# Patient Record
Sex: MALE | Race: ASIAN | NOT HISPANIC OR LATINO
[De-identification: names, ages, dates, MRNs, and addresses within clinical notes are randomized per-mention and may not be internally consistent; named-entity substitution may affect disease eponyms.]

---

## 2019-06-03 ENCOUNTER — APPOINTMENT (OUTPATIENT)
Dept: UROLOGY | Facility: CLINIC | Age: 69
End: 2019-06-03
Payer: MEDICAID

## 2019-06-03 VITALS
BODY MASS INDEX: 26.84 KG/M2 | DIASTOLIC BLOOD PRESSURE: 93 MMHG | RESPIRATION RATE: 18 BRPM | SYSTOLIC BLOOD PRESSURE: 158 MMHG | HEART RATE: 70 BPM | OXYGEN SATURATION: 98 % | HEIGHT: 66 IN | TEMPERATURE: 97.9 F | WEIGHT: 167 LBS

## 2019-06-03 DIAGNOSIS — I10 ESSENTIAL (PRIMARY) HYPERTENSION: ICD-10-CM

## 2019-06-03 DIAGNOSIS — E11.9 TYPE 2 DIABETES MELLITUS W/OUT COMPLICATIONS: ICD-10-CM

## 2019-06-03 PROCEDURE — 99204 OFFICE O/P NEW MOD 45 MIN: CPT | Mod: 25

## 2019-06-03 PROCEDURE — 51798 US URINE CAPACITY MEASURE: CPT

## 2019-06-03 RX ORDER — SITAGLIPTIN 100 MG/1
100 TABLET, FILM COATED ORAL
Refills: 0 | Status: ACTIVE | COMMUNITY

## 2019-06-03 RX ORDER — ASPIRIN 81 MG
81 TABLET, DELAYED RELEASE (ENTERIC COATED) ORAL
Refills: 0 | Status: ACTIVE | COMMUNITY

## 2019-06-03 RX ORDER — METFORMIN ER 500 MG 500 MG/1
500 TABLET ORAL
Refills: 0 | Status: ACTIVE | COMMUNITY

## 2019-06-03 RX ORDER — CARVEDILOL 6.25 MG/1
6.25 TABLET, FILM COATED ORAL
Refills: 0 | Status: ACTIVE | COMMUNITY

## 2019-06-03 RX ORDER — CIPROFLOXACIN HYDROCHLORIDE 500 MG/1
500 TABLET, FILM COATED ORAL
Qty: 4 | Refills: 0 | Status: ACTIVE | COMMUNITY
Start: 2019-06-03 | End: 1900-01-01

## 2019-06-03 NOTE — HISTORY OF PRESENT ILLNESS
[Urinary Incontinence] : no urinary incontinence [FreeTextEntry1] : Patient is a 69 yo M who presents for elevated PSA 5.58.  He had routine labs with PCP in 5/2019 showing PSA of 5.58.  His last prior PSA was in 2017 - which was 3.3.\par \par He has mild LUTS - urgency, frequency q1.5-2 hrs, nocturia x1.  He is not bothered enough to take medication.   [Urinary Retention] : no urinary retention [Nocturia] : nocturia [Straining] : no straining [Weak Stream] : no weak stream [Intermittency] : intermittency [Hematuria - Gross] : no gross hematuria [Dysuria] : no dysuria

## 2019-06-03 NOTE — ASSESSMENT
[FreeTextEntry1] : Patient is a 69 yo M who presents for BPH and elevated PSA.\par \par I had a long discussion with the pt regarding the significance of the elevated PSA and its implications including possibility of benign and malignant etiologies for elevated PSA. I discussed that although PSA is not a perfect test for prostate cancer, it is the most well studied screening tool for prostate cancer. I discussed that his value is considered abnormal and discussed with pt that routinely prostate biopsy is recommended to determine if there is cancer. \par I explained that if prostate cancer is indeed discovered, treatment options vary depending on the pathology characteristics. \par I also discussed risks of prostate biopsy - including infection/sepsis, bleeding (hematuria, hematochezia, hematospermia), pain. I counseled on nature of biopsy and need for enema and pre-procedure abx for decreasing risk of infection\par Will repeat PSA today.\par F/u for prostate biopsy\par PVR today 100cc

## 2019-06-03 NOTE — PHYSICAL EXAM
[General Appearance - Well Developed] : well developed [General Appearance - Well Nourished] : well nourished [Normal Appearance] : normal appearance [Well Groomed] : well groomed [General Appearance - In No Acute Distress] : no acute distress [Respiration, Rhythm And Depth] : normal respiratory rhythm and effort [Edema] : no peripheral edema [Abdomen Soft] : soft [Abdomen Tenderness] : non-tender [Exaggerated Use Of Accessory Muscles For Inspiration] : no accessory muscle use [Costovertebral Angle Tenderness] : no ~M costovertebral angle tenderness [Urethral Meatus] : meatus normal [Penis Abnormality] : normal uncircumcised penis [Urinary Bladder Findings] : the bladder was normal on palpation [Scrotum] : the scrotum was normal [Testes Tenderness] : no tenderness of the testes [Prostate Tenderness] : the prostate was not tender [Testes Mass (___cm)] : there were no testicular masses [No Prostate Nodules] : no prostate nodules [Prostate Size ___ gm] : prostate size [unfilled] gm [Normal Station and Gait] : the gait and station were normal for the patient's age [] : no rash [No Focal Deficits] : no focal deficits [Oriented To Time, Place, And Person] : oriented to person, place, and time [Mood] : the mood was normal [Affect] : the affect was normal [Not Anxious] : not anxious

## 2019-06-10 ENCOUNTER — APPOINTMENT (OUTPATIENT)
Dept: UROLOGY | Facility: CLINIC | Age: 69
End: 2019-06-10
Payer: MEDICAID

## 2019-06-10 VITALS — SYSTOLIC BLOOD PRESSURE: 123 MMHG | DIASTOLIC BLOOD PRESSURE: 79 MMHG | HEART RATE: 77 BPM | RESPIRATION RATE: 100 BRPM

## 2019-06-10 VITALS
WEIGHT: 165 LBS | DIASTOLIC BLOOD PRESSURE: 86 MMHG | OXYGEN SATURATION: 99 % | SYSTOLIC BLOOD PRESSURE: 138 MMHG | BODY MASS INDEX: 26.63 KG/M2 | HEART RATE: 67 BPM | RESPIRATION RATE: 18 BRPM | TEMPERATURE: 98 F

## 2019-06-10 LAB — PSA SERPL-MCNC: 5.28 NG/ML

## 2019-06-10 PROCEDURE — 55700: CPT

## 2019-06-10 PROCEDURE — 76942 ECHO GUIDE FOR BIOPSY: CPT | Mod: 59

## 2019-06-10 PROCEDURE — 76872 US TRANSRECTAL: CPT

## 2019-06-10 RX ORDER — AMIKACIN SULFATE 250 MG/ML
500 INJECTION, SOLUTION INTRAMUSCULAR; INTRAVENOUS
Qty: 0 | Refills: 0 | Status: COMPLETED | OUTPATIENT
Start: 2019-06-10

## 2019-06-10 RX ORDER — AMIKACIN SULFATE 250 MG/ML
500 INJECTION, SOLUTION INTRAMUSCULAR; INTRAVENOUS
Refills: 0 | Status: COMPLETED | OUTPATIENT
Start: 2019-06-10

## 2019-06-10 RX ADMIN — AMIKACIN SULFATE 2 MG/2ML: 250 INJECTION, SOLUTION INTRAMUSCULAR; INTRAVENOUS at 00:00

## 2019-06-20 ENCOUNTER — APPOINTMENT (OUTPATIENT)
Dept: UROLOGY | Facility: CLINIC | Age: 69
End: 2019-06-20
Payer: MEDICAID

## 2019-06-20 VITALS
SYSTOLIC BLOOD PRESSURE: 130 MMHG | OXYGEN SATURATION: 97 % | HEART RATE: 84 BPM | TEMPERATURE: 97.8 F | RESPIRATION RATE: 18 BRPM | WEIGHT: 168 LBS | BODY MASS INDEX: 27.12 KG/M2 | DIASTOLIC BLOOD PRESSURE: 79 MMHG

## 2019-06-20 LAB — CORE LAB BIOPSY: NORMAL

## 2019-06-20 PROCEDURE — 99213 OFFICE O/P EST LOW 20 MIN: CPT

## 2019-06-23 NOTE — PHYSICAL EXAM
[General Appearance - Well Nourished] : well nourished [Normal Appearance] : normal appearance [General Appearance - Well Developed] : well developed [Oriented To Time, Place, And Person] : oriented to person, place, and time [General Appearance - In No Acute Distress] : no acute distress [Well Groomed] : well groomed [Mood] : the mood was normal [Not Anxious] : not anxious [Affect] : the affect was normal

## 2019-06-23 NOTE — ASSESSMENT
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, remainder was BPH.\par I discussed with pt that HGPIN can be considered as a potential sign of potential prostate cancer that being harbored and not able to fully assessed, although it is not prostate cancer.\par I discussed with pt that I would recommend prostate biopsy on repeat within 2-3 months to reassess the prostate.\par I discussed with pt that prostate MRI may potentially be able to visualize a lesion to perform a targeted MRI fusion prostate biopsy, but unclear if will be covered by insurance.\par He wishes to hold off at this time with MRI or repeat biopsy, understands risks of missed prostate cancer diagnosis.\par Will f/u in 2-3 mos for repeat PSA.

## 2019-06-27 ENCOUNTER — APPOINTMENT (OUTPATIENT)
Dept: UROLOGY | Facility: CLINIC | Age: 69
End: 2019-06-27

## 2019-08-19 ENCOUNTER — APPOINTMENT (OUTPATIENT)
Dept: UROLOGY | Facility: CLINIC | Age: 69
End: 2019-08-19
Payer: MEDICAID

## 2019-08-19 VITALS
RESPIRATION RATE: 18 BRPM | TEMPERATURE: 97.9 F | SYSTOLIC BLOOD PRESSURE: 132 MMHG | HEART RATE: 80 BPM | OXYGEN SATURATION: 99 % | DIASTOLIC BLOOD PRESSURE: 83 MMHG | WEIGHT: 169 LBS | BODY MASS INDEX: 27.28 KG/M2

## 2019-08-19 PROCEDURE — 99213 OFFICE O/P EST LOW 20 MIN: CPT

## 2019-08-19 NOTE — HISTORY OF PRESENT ILLNESS
[FreeTextEntry1] : Patient is a 67 yo M who presents for f/u of elevated PSA 5.58.  He had routine labs with PCP in 5/2019 showing PSA of 5.58.  His last prior PSA was in 2017 - which was 3.3.\par \par He has mild LUTS - urgency, frequency q1.5-2 hrs, nocturia x1.  He is not bothered enough to take medication.  \par \par He underwent prostate biopsy 6/2019 - path showed HGPIN in 1 core, otherwise negative.  He was counseled on the implications of HGPIN but refused further evaluation with prostate MRI or repeat prostate biopsy.  \par \par He is here for repeat PSA blood draw.  He has no new symptoms or change in LUTS.  He is doing well.

## 2019-08-19 NOTE — PHYSICAL EXAM
[General Appearance - Well Developed] : well developed [General Appearance - Well Nourished] : well nourished [Normal Appearance] : normal appearance [General Appearance - In No Acute Distress] : no acute distress [Well Groomed] : well groomed [] : no respiratory distress [Exaggerated Use Of Accessory Muscles For Inspiration] : no accessory muscle use [Respiration, Rhythm And Depth] : normal respiratory rhythm and effort [Oriented To Time, Place, And Person] : oriented to person, place, and time [Affect] : the affect was normal [Mood] : the mood was normal [Not Anxious] : not anxious

## 2019-08-22 LAB — PSA SERPL-MCNC: 8.01 NG/ML

## 2019-12-16 ENCOUNTER — APPOINTMENT (OUTPATIENT)
Dept: UROLOGY | Facility: CLINIC | Age: 69
End: 2019-12-16
Payer: MEDICAID

## 2019-12-16 VITALS
TEMPERATURE: 97.8 F | OXYGEN SATURATION: 95 % | WEIGHT: 166 LBS | RESPIRATION RATE: 18 BRPM | DIASTOLIC BLOOD PRESSURE: 87 MMHG | BODY MASS INDEX: 26.79 KG/M2 | SYSTOLIC BLOOD PRESSURE: 147 MMHG | HEART RATE: 82 BPM

## 2019-12-16 PROCEDURE — 99213 OFFICE O/P EST LOW 20 MIN: CPT

## 2019-12-16 NOTE — PHYSICAL EXAM
[General Appearance - Well Developed] : well developed [General Appearance - Well Nourished] : well nourished [Normal Appearance] : normal appearance [Well Groomed] : well groomed [General Appearance - In No Acute Distress] : no acute distress [Oriented To Time, Place, And Person] : oriented to person, place, and time [Mood] : the mood was normal [Affect] : the affect was normal [Not Anxious] : not anxious

## 2019-12-16 NOTE — ASSESSMENT
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, remainder was BPH.\par Again I discussed with pt that HGPIN can be considered as a potential sign of potential prostate cancer that being harbored and not able to fully assessed, although it is not prostate cancer.\par I discussed with pt that I would recommend prostate biopsy on repeat within 2-3 months to reassess the prostate.\par I discussed with pt that prostate MRI may potentially be able to visualize a lesion to perform a targeted MRI fusion prostate biopsy, but unclear if will be covered by insurance.\par He wishes to hold off at this time with MRI or repeat biopsy, understands risks of missed prostate cancer diagnosis.\par Repeat PSA today - he willing to consider repeat MRI if PSA continues to rise\par F/u 3 mos for repeat PSA. \par

## 2019-12-16 NOTE — HISTORY OF PRESENT ILLNESS
[FreeTextEntry1] : Patient is a 67 yo M who presents for f/u of elevated PSA 5.58.  He had routine labs with PCP in 5/2019 showing PSA of 5.58.  His last prior PSA was in 2017 - which was 3.3.\par \par He has mild LUTS - urgency, frequency q1.5-2 hrs, nocturia x1.  He is not bothered enough to take medication.  \par \par He underwent prostate biopsy 6/2019 - path showed HGPIN in 1 core, otherwise negative.  He was counseled on the implications of HGPIN but refused further evaluation with prostate MRI or repeat prostate biopsy.  \par \par He is here for repeat PSA blood draw.  He has no new symptoms or change in LUTS.  He is doing well.  No dysuria or hematuria.  Feels well.

## 2019-12-19 LAB — PSA SERPL-MCNC: 5.96 NG/ML

## 2020-04-20 ENCOUNTER — APPOINTMENT (OUTPATIENT)
Dept: UROLOGY | Facility: CLINIC | Age: 70
End: 2020-04-20

## 2020-05-18 NOTE — REVIEW OF SYSTEMS
[Negative] : Heme/Lymph [Heart Rate Is Fast] : fast heart rate [Wake up at night to urinate  How many times?  ___] : wakes up to urinate [unfilled] times during the night

## 2020-05-20 ENCOUNTER — APPOINTMENT (OUTPATIENT)
Dept: UROLOGY | Facility: CLINIC | Age: 70
End: 2020-05-20
Payer: MEDICAID

## 2020-05-20 PROCEDURE — 99213 OFFICE O/P EST LOW 20 MIN: CPT | Mod: 95

## 2020-05-20 NOTE — ASSESSMENT
[FreeTextEntry1] : Patient is a 68 yo M who presents for f/u of BPH, elevated PSA.\par \par Will give trial of flomax for symptoms. Counseled on proper use and SE profile, including LH, retrograde ejaculation. D/w pt that if he experiences LH to stop the medication and call.\par F/u 6 wks, he will come to office and have PSA blood test drawn then\par

## 2020-05-20 NOTE — HISTORY OF PRESENT ILLNESS
[Home] : at home, [unfilled] , at the time of the visit. [Patient] : the patient [Family Member] : family member [Other Location: e.g. Home (Enter Location, City,State)___] : at [unfilled] [FreeTextEntry2] : HLA LWIN [FreeTextEntry1] : Patient is a 68 yo M who presents for f/u of BPH, elevated PSA 5.58.  He had routine labs with PCP in 5/2019 showing PSA of 5.58.  His last prior PSA was in 2017 - which was 3.3.\par \par He has mild-mod LUTS - urgency, frequency q1.5-2 hrs, nocturia x1.  He is interested in trial of medication now. \par \par He underwent prostate biopsy 6/2019 - path showed HGPIN in 1 core, otherwise negative.  TRUS vol 66cc.  He was counseled on the implications of HGPIN but refused further evaluation with prostate MRI or repeat prostate biopsy.  \par \par He had repeat PSA 5.96 12/2019.  Stable. [Urinary Incontinence] : no urinary incontinence [Dysuria] : no dysuria [Hematuria - Gross] : no gross hematuria

## 2020-06-29 ENCOUNTER — APPOINTMENT (OUTPATIENT)
Dept: UROLOGY | Facility: CLINIC | Age: 70
End: 2020-06-29
Payer: MEDICAID

## 2020-06-29 VITALS
TEMPERATURE: 97.9 F | HEART RATE: 64 BPM | OXYGEN SATURATION: 97 % | SYSTOLIC BLOOD PRESSURE: 148 MMHG | DIASTOLIC BLOOD PRESSURE: 90 MMHG | WEIGHT: 178 LBS | BODY MASS INDEX: 28.73 KG/M2 | RESPIRATION RATE: 18 BRPM

## 2020-06-29 PROCEDURE — 99213 OFFICE O/P EST LOW 20 MIN: CPT

## 2020-06-29 NOTE — ASSESSMENT
[FreeTextEntry1] : Patient is a 70 yo M who presents for f/u of BPH, elevated PSA.\par S/p prostate biopsy with HGPIN in 1 core.  Pt refused repeat Pbx or MRI\par \par Cont flomax - renewed\par PSA today\par F/u 6 mos.\par \par

## 2020-06-29 NOTE — HISTORY OF PRESENT ILLNESS
[FreeTextEntry1] : Patient is a 70 yo M who presents for f/u of BPH, elevated PSA 5.58.  He had routine labs with PCP in 5/2019 showing PSA of 5.58.  His last prior PSA was in 2017 - which was 3.3.\par \par He has mild-mod LUTS - urgency, frequency q1.5-2 hrs, nocturia x1.  \par \par He underwent prostate biopsy 6/2019 - path showed HGPIN in 1 core, otherwise negative.  TRUS vol 66cc.  He was counseled on the implications of HGPIN but refused further evaluation with prostate MRI or repeat prostate biopsy.  \par \par He had repeat PSA 5.96 12/2019.  Stable.\par \par He was started on flomax for his LUTS.  Reports 50% improvement in his LUTS.  Better FOS, less urgency/frequency. [Dysuria] : no dysuria [Hematuria - Gross] : no gross hematuria [Fever] : no fever

## 2020-07-02 ENCOUNTER — APPOINTMENT (OUTPATIENT)
Age: 70
End: 2020-07-02

## 2020-07-02 LAB — PSA SERPL-MCNC: 5.57 NG/ML

## 2021-01-11 ENCOUNTER — APPOINTMENT (OUTPATIENT)
Dept: UROLOGY | Facility: CLINIC | Age: 71
End: 2021-01-11

## 2021-01-25 ENCOUNTER — APPOINTMENT (OUTPATIENT)
Dept: UROLOGY | Facility: CLINIC | Age: 71
End: 2021-01-25
Payer: MEDICAID

## 2021-01-25 VITALS
TEMPERATURE: 98.2 F | BODY MASS INDEX: 27.76 KG/M2 | HEART RATE: 101 BPM | RESPIRATION RATE: 18 BRPM | SYSTOLIC BLOOD PRESSURE: 156 MMHG | DIASTOLIC BLOOD PRESSURE: 100 MMHG | OXYGEN SATURATION: 98 % | WEIGHT: 172 LBS

## 2021-01-25 PROCEDURE — 99072 ADDL SUPL MATRL&STAF TM PHE: CPT

## 2021-01-25 PROCEDURE — 99214 OFFICE O/P EST MOD 30 MIN: CPT

## 2021-01-25 NOTE — HISTORY OF PRESENT ILLNESS
[FreeTextEntry1] : Patient is a 69 yo M who presents for f/u of BPH, elevated PSA 5.58.  He had routine labs with PCP in 5/2019 showing PSA of 5.58.  His last prior PSA was in 2017 - which was 3.3.\par \par He has mild-mod LUTS - urgency, frequency q1.5-2 hrs, nocturia x1.  \par \par He underwent prostate biopsy 6/2019 - path showed HGPIN in 1 core, otherwise negative.  TRUS vol 66cc.  He was counseled on the implications of HGPIN but refused further evaluation with prostate MRI or repeat prostate biopsy at that time.  \par \par He was started on flomax for his LUTS.  Reports 50% improvement in his LUTS.  Better FOS, less urgency/frequency.  He remains on flomax and reports stable LUTS.  No hematuria or dysuria.\par \par More recently he had PSA with PCP - 6.87 in 9/2020.\par \par He is willing to consider MRI prostate now. [Urinary Incontinence] : no urinary incontinence [Dysuria] : no dysuria [Hematuria - Gross] : no gross hematuria [Fever] : no fever

## 2021-01-25 NOTE — ASSESSMENT
[FreeTextEntry1] : Patient is a 71 yo M who presents for f/u of BPH, elevated PSA.\par Prior prostate biopsy showed HGPIN in 6/2019.\par Most recent PSA rising - 6.87.\par \par Cont flomax for LUTS.\par Pt is willing to undergo prostate MRI currently.\par Will repeat PSA.\par Obtain prostate MRI - he wishes to have done at [facility].  Given HGPIN need to assess for possibility of prostate cancer developing/undiagnosed\par Will contact w results\par If negative MRI, f/u 4-6 mos for repeat PSA

## 2021-01-26 ENCOUNTER — TRANSCRIPTION ENCOUNTER (OUTPATIENT)
Age: 71
End: 2021-01-26

## 2021-01-28 ENCOUNTER — APPOINTMENT (OUTPATIENT)
Age: 71
End: 2021-01-28

## 2021-01-28 LAB — PSA SERPL-MCNC: 6.05 NG/ML

## 2021-02-03 ENCOUNTER — NON-APPOINTMENT (OUTPATIENT)
Age: 71
End: 2021-02-03

## 2021-02-08 ENCOUNTER — NON-APPOINTMENT (OUTPATIENT)
Age: 71
End: 2021-02-08

## 2021-02-14 ENCOUNTER — APPOINTMENT (OUTPATIENT)
Dept: DISASTER EMERGENCY | Facility: CLINIC | Age: 71
End: 2021-02-14

## 2021-02-14 DIAGNOSIS — Z01.818 ENCOUNTER FOR OTHER PREPROCEDURAL EXAMINATION: ICD-10-CM

## 2021-02-15 LAB — SARS-COV-2 N GENE NPH QL NAA+PROBE: NOT DETECTED

## 2021-02-25 ENCOUNTER — NON-APPOINTMENT (OUTPATIENT)
Age: 71
End: 2021-02-25

## 2021-03-04 ENCOUNTER — APPOINTMENT (OUTPATIENT)
Dept: UROLOGY | Facility: CLINIC | Age: 71
End: 2021-03-04
Payer: MEDICAID

## 2021-03-04 VITALS
SYSTOLIC BLOOD PRESSURE: 135 MMHG | TEMPERATURE: 97.7 F | HEART RATE: 91 BPM | RESPIRATION RATE: 18 BRPM | BODY MASS INDEX: 28.47 KG/M2 | WEIGHT: 176.37 LBS | DIASTOLIC BLOOD PRESSURE: 87 MMHG | OXYGEN SATURATION: 98 %

## 2021-03-04 DIAGNOSIS — Z00.00 ENCOUNTER FOR GENERAL ADULT MEDICAL EXAMINATION W/OUT ABNORMAL FINDINGS: ICD-10-CM

## 2021-03-04 PROCEDURE — 99072 ADDL SUPL MATRL&STAF TM PHE: CPT

## 2021-03-04 PROCEDURE — 99213 OFFICE O/P EST LOW 20 MIN: CPT

## 2021-03-04 RX ORDER — ENEMA 19; 7 G/133ML; G/133ML
7-19 ENEMA RECTAL
Qty: 1 | Refills: 0 | Status: ACTIVE | COMMUNITY
Start: 2019-06-03 | End: 1900-01-01

## 2021-03-04 RX ORDER — ENEMA 19; 7 G/133ML; G/133ML
7-19 ENEMA RECTAL
Qty: 1 | Refills: 0 | Status: ACTIVE | COMMUNITY
Start: 2021-03-04 | End: 1900-01-01

## 2021-03-04 NOTE — HISTORY OF PRESENT ILLNESS
[FreeTextEntry1] : Patient is a 71 yo M who presents for f/u of BPH, elevated PSA.\par \par He has mild-mod LUTS - urgency, frequency q1.5-2 hrs, nocturia x1.  \par \par He underwent prostate biopsy 6/2019 - path showed HGPIN in 1 core, otherwise negative.  TRUS vol 66cc.  He was counseled on the implications of HGPIN but refused further evaluation with prostate MRI or repeat prostate biopsy at that time.  \par \par He was started on flomax for his LUTS.  Reports 50% improvement in his LUTS.  Better FOS, less urgency/frequency.  He remains on flomax and reports stable LUTS.  No hematuria or dysuria.\par \par More recently he had PSA with PCP - 6.87 in 9/2020. Repeat PSA in 1/2021 6.05.  He was now willing to have MRI of prostate.  MRI of prostate performed - showed PiRads 4, and PiRads 3 x2 lesions.\par

## 2021-03-04 NOTE — ASSESSMENT
[FreeTextEntry1] : Patient is a 71 yo M who presents for f/u of BPH, elevated PSA.\par Prior prostate biopsy showed HGPIN in 6/2019.\par Most recent prostate MRI showed PIRADs 4 and 3 lesions.\par \par Cont flomax for LUTS.\par Given MRI results and prior pbx results, discussed with pt need to perform MRI fusion targeted prostate biopsy.\par D/w pt transperineal approach and risks/benefits.\par D/w pt fleet enema.\par D/w pt that would refer to my partner who has experience performing this specialized prostate biopsy.\par D/w pt that MRI fusion biopsy would need to be performed in our [facility] office.\par Pt agrees\par

## 2021-03-05 ENCOUNTER — NON-APPOINTMENT (OUTPATIENT)
Age: 71
End: 2021-03-05

## 2021-03-11 ENCOUNTER — OUTPATIENT (OUTPATIENT)
Dept: OUTPATIENT SERVICES | Facility: HOSPITAL | Age: 71
LOS: 1 days | End: 2021-03-11
Payer: MEDICAID

## 2021-03-11 ENCOUNTER — APPOINTMENT (OUTPATIENT)
Dept: UROLOGY | Facility: CLINIC | Age: 71
End: 2021-03-11
Payer: MEDICAID

## 2021-03-11 VITALS
SYSTOLIC BLOOD PRESSURE: 133 MMHG | DIASTOLIC BLOOD PRESSURE: 86 MMHG | RESPIRATION RATE: 18 BRPM | TEMPERATURE: 98.6 F | HEART RATE: 104 BPM

## 2021-03-11 VITALS — HEART RATE: 114 BPM | DIASTOLIC BLOOD PRESSURE: 78 MMHG | SYSTOLIC BLOOD PRESSURE: 118 MMHG

## 2021-03-11 DIAGNOSIS — R35.0 FREQUENCY OF MICTURITION: ICD-10-CM

## 2021-03-11 PROCEDURE — 76942 ECHO GUIDE FOR BIOPSY: CPT | Mod: 59

## 2021-03-11 PROCEDURE — 76942 ECHO GUIDE FOR BIOPSY: CPT | Mod: 26,59

## 2021-03-11 PROCEDURE — 76872 US TRANSRECTAL: CPT

## 2021-03-11 PROCEDURE — 55700: CPT

## 2021-03-11 PROCEDURE — 76377 3D RENDER W/INTRP POSTPROCES: CPT | Mod: 26

## 2021-03-11 PROCEDURE — 76872 US TRANSRECTAL: CPT | Mod: 26

## 2021-03-12 ENCOUNTER — NON-APPOINTMENT (OUTPATIENT)
Age: 71
End: 2021-03-12

## 2021-03-12 DIAGNOSIS — R97.20 ELEVATED PROSTATE SPECIFIC ANTIGEN [PSA]: ICD-10-CM

## 2021-03-12 DIAGNOSIS — N40.1 BENIGN PROSTATIC HYPERPLASIA WITH LOWER URINARY TRACT SYMPTOMS: ICD-10-CM

## 2021-03-14 ENCOUNTER — APPOINTMENT (OUTPATIENT)
Dept: DISASTER EMERGENCY | Facility: CLINIC | Age: 71
End: 2021-03-14

## 2021-03-14 DIAGNOSIS — Z01.818 ENCOUNTER FOR OTHER PREPROCEDURAL EXAMINATION: ICD-10-CM

## 2021-03-15 LAB — SARS-COV-2 N GENE NPH QL NAA+PROBE: NOT DETECTED

## 2021-03-19 ENCOUNTER — NON-APPOINTMENT (OUTPATIENT)
Age: 71
End: 2021-03-19

## 2021-03-19 LAB — CORE LAB BIOPSY: NORMAL

## 2021-08-02 ENCOUNTER — APPOINTMENT (OUTPATIENT)
Dept: UROLOGY | Facility: CLINIC | Age: 71
End: 2021-08-02

## 2022-01-27 ENCOUNTER — APPOINTMENT (OUTPATIENT)
Dept: UROLOGY | Facility: CLINIC | Age: 72
End: 2022-01-27
Payer: MEDICAID

## 2022-01-27 VITALS
OXYGEN SATURATION: 95 % | RESPIRATION RATE: 16 BRPM | TEMPERATURE: 97.5 F | DIASTOLIC BLOOD PRESSURE: 82 MMHG | SYSTOLIC BLOOD PRESSURE: 124 MMHG | WEIGHT: 170 LBS | BODY MASS INDEX: 27.44 KG/M2 | HEART RATE: 94 BPM

## 2022-01-27 PROCEDURE — 99213 OFFICE O/P EST LOW 20 MIN: CPT

## 2022-01-27 NOTE — HISTORY OF PRESENT ILLNESS
[FreeTextEntry1] : Patient is a 69 yo M who presents for f/u of BPH, elevated PSA.\par \par He has mild-mod LUTS - urgency, frequency q1.5-2 hrs, nocturia x1.  \par \par He underwent prostate biopsy 6/2019 - path showed HGPIN in 1 core, otherwise negative.  TRUS vol 66cc.  He was counseled on the implications of HGPIN but refused further evaluation with prostate MRI or repeat prostate biopsy at that time.  \par \par He was started on flomax for his LUTS.  Reports 50% improvement in his LUTS.  Better FOS, less urgency/frequency.  He remains on flomax and reports stable LUTS.  \par \par Then he had PSA with PCP - 6.87 in 9/2020. Repeat PSA in 1/2021 6.05.  He was now willing to have MRI of prostate.  MRI of prostate performed - showed PiRads 4, and PiRads 3 x2 lesions.\par \par He underwent MRI fusion prostate biopsy with Dr Barreto in 3/2021 - path was negative.\par \par Here for repeat PSA.  He remains stable with LUTS on flomax.  No changes or difficulty urinating.\par No hematuria or dysuria.\par

## 2022-01-27 NOTE — ASSESSMENT
[FreeTextEntry1] : Patient is a 70 yo M who presents for f/u of BPH, elevated PSA.\par Prior prostate biopsy showed HGPIN in 6/2019.\par Underwent prostate MRI which showed concerning lesions, however MRI fusion pbx was negative/benign.\par \par Cont flomax for stable LUTS.\par Repeat PSA today\par F/u 6-12 mos for repeat PSA\par

## 2022-02-02 LAB — PSA SERPL-MCNC: 7.45 NG/ML

## 2022-10-27 ENCOUNTER — APPOINTMENT (OUTPATIENT)
Dept: UROLOGY | Facility: CLINIC | Age: 72
End: 2022-10-27

## 2023-01-30 ENCOUNTER — APPOINTMENT (OUTPATIENT)
Dept: UROLOGY | Facility: CLINIC | Age: 73
End: 2023-01-30
Payer: MEDICAID

## 2023-01-30 VITALS
RESPIRATION RATE: 16 BRPM | TEMPERATURE: 98.1 F | HEART RATE: 86 BPM | DIASTOLIC BLOOD PRESSURE: 81 MMHG | OXYGEN SATURATION: 99 % | BODY MASS INDEX: 27.44 KG/M2 | SYSTOLIC BLOOD PRESSURE: 127 MMHG | WEIGHT: 170 LBS

## 2023-01-30 PROCEDURE — 99213 OFFICE O/P EST LOW 20 MIN: CPT

## 2023-01-30 NOTE — HISTORY OF PRESENT ILLNESS
[FreeTextEntry1] : Patient is a 73 yo M who presents for f/u of BPH, elevated PSA.\par \par Prior hx:\par He has mild-mod LUTS - urgency, frequency q1.5-2 hrs, nocturia x1.  \par He underwent prostate biopsy 6/2019 - path showed HGPIN in 1 core, otherwise negative.  TRUS vol 66cc.  He was counseled on the implications of HGPIN but refused further evaluation with prostate MRI or repeat prostate biopsy at that time.  \par He was started on flomax for his LUTS.  Reports 50% improvement in his LUTS.  Better FOS, less urgency/frequency.  He remains on flomax and reports stable LUTS.  \par Then he had PSA with PCP - 6.87 in 9/2020. Repeat PSA in 1/2021 6.05.  He was now willing to have MRI of prostate.  MRI of prostate performed - showed PiRads 4, and PiRads 3 x2 lesions.\par He underwent MRI fusion prostate biopsy with Dr Barreto in 3/2021 - path was negative.\par \par Interval hx:\par Here for f/u.   He remains stable with LUTS on flomax.  No changes or difficulty urinating.\par No hematuria or dysuria.\par \par Had labs with PCP 12/17/22 - PSA was stable 7.\par \par PSA trend recently\par 1/2022 - 7.45\par 6/2022 - 6.45\par 12/2022 - 7

## 2023-01-30 NOTE — ASSESSMENT
[FreeTextEntry1] : Patient is a 71 yo M who presents for f/u of BPH, elevated PSA.\par Prior prostate biopsy showed HGPIN in 6/2019.\par Underwent prostate MRI which showed concerning lesions, however MRI fusion pbx was negative/benign in 2021\par \par Cont flomax for stable LUTS.\par PSAs have been stable\par Will check PVR \par F/u 6 mos for repeat PSA (here or at PCP)\par

## 2023-07-31 ENCOUNTER — APPOINTMENT (OUTPATIENT)
Dept: UROLOGY | Facility: CLINIC | Age: 73
End: 2023-07-31
Payer: MEDICAID

## 2023-07-31 VITALS
DIASTOLIC BLOOD PRESSURE: 81 MMHG | OXYGEN SATURATION: 99 % | TEMPERATURE: 97.5 F | HEART RATE: 77 BPM | BODY MASS INDEX: 28.34 KG/M2 | WEIGHT: 175.6 LBS | SYSTOLIC BLOOD PRESSURE: 132 MMHG | RESPIRATION RATE: 17 BRPM

## 2023-07-31 PROCEDURE — 99213 OFFICE O/P EST LOW 20 MIN: CPT

## 2023-07-31 RX ORDER — TAMSULOSIN HYDROCHLORIDE 0.4 MG/1
0.4 CAPSULE ORAL
Qty: 90 | Refills: 3 | Status: ACTIVE | COMMUNITY
Start: 2020-05-20 | End: 1900-01-01

## 2023-07-31 NOTE — ASSESSMENT
[FreeTextEntry1] : Patient is a 71 yo M who presents for f/u of BPH, elevated PSA. Prior prostate biopsy showed HGPIN in 6/2019. Underwent prostate MRI which showed concerning lesions, however MRI fusion targeted pbx was negative/benign in 3/2021.  Cont flomax for stable LUTS. Repeat PSA was performed by PCP last week - stable D/w pt that will follow closely with serial PSAs q6 mos F/u 6 mos

## 2023-07-31 NOTE — HISTORY OF PRESENT ILLNESS
[FreeTextEntry1] : Patient is a 71 yo M who presents for f/u of BPH, elevated PSA.  Prior hx: He has mild-mod LUTS - urgency, frequency q1.5-2 hrs, nocturia x1.   He underwent prostate biopsy 6/2019 - path showed HGPIN in 1 core, otherwise negative.  TRUS vol 66cc.  He was counseled on the implications of HGPIN but refused further evaluation with prostate MRI or repeat prostate biopsy at that time.   He was started on flomax for his LUTS.  Reports 50% improvement in his LUTS.  Better FOS, less urgency/frequency.  He remains on flomax and reports stable LUTS.   Then he had PSA with PCP - 6.87 in 9/2020. Repeat PSA in 1/2021 6.05.  He was now willing to have MRI of prostate.  MRI of prostate performed - showed PiRads 4, and PiRads 3 x2 lesions. He underwent MRI fusion prostate biopsy with Dr Barreot in 3/2021 - path was negative.  Interval hx: Here for f/u.   He remains stable with LUTS on flomax.  No changes or difficulty urinating. No hematuria or dysuria.  Overall doing well.  Had labs with PCP last week. PSA 5.47 7/2023  PSA trend recently 1/2022 - 7.45 6/2022 - 6.45 12/2022 - 7.0 7/2023 - 5.47

## 2024-01-29 ENCOUNTER — APPOINTMENT (OUTPATIENT)
Dept: UROLOGY | Facility: CLINIC | Age: 74
End: 2024-01-29

## 2024-05-20 ENCOUNTER — APPOINTMENT (OUTPATIENT)
Dept: UROLOGY | Facility: CLINIC | Age: 74
End: 2024-05-20
Payer: MEDICAID

## 2024-05-20 VITALS
WEIGHT: 177.1 LBS | HEART RATE: 76 BPM | DIASTOLIC BLOOD PRESSURE: 72 MMHG | SYSTOLIC BLOOD PRESSURE: 111 MMHG | RESPIRATION RATE: 17 BRPM | TEMPERATURE: 97.5 F | OXYGEN SATURATION: 98 % | BODY MASS INDEX: 28.59 KG/M2

## 2024-05-20 DIAGNOSIS — N42.31 PROSTATIC INTRAEPITHELIAL NEOPLASIA: ICD-10-CM

## 2024-05-20 DIAGNOSIS — R97.20 ELEVATED PROSTATE, SPECIFIC ANTIGEN [PSA]: ICD-10-CM

## 2024-05-20 DIAGNOSIS — N13.8 BENIGN PROSTATIC HYPERPLASIA WITH LOWER URINARY TRACT SYMPMS: ICD-10-CM

## 2024-05-20 DIAGNOSIS — N40.1 BENIGN PROSTATIC HYPERPLASIA WITH LOWER URINARY TRACT SYMPMS: ICD-10-CM

## 2024-05-20 PROCEDURE — 99213 OFFICE O/P EST LOW 20 MIN: CPT

## 2024-05-20 PROCEDURE — G2211 COMPLEX E/M VISIT ADD ON: CPT | Mod: NC,1L

## 2024-05-20 NOTE — ASSESSMENT
[FreeTextEntry1] : Patient is a 72 yo M who presents for f/u of BPH, elevated PSA. Prior prostate biopsy showed HGPIN in 6/2019. Underwent prostate MRI in 2021 which showed concerning lesions, however MRI fusion targeted pbx was negative/benign in 3/2021.  Stable LUTS, will continue flomax.  Repeat PSA was performed by PCP in April - stable D/w pt that will follow closely with serial PSAs q6 mos  He will require longitudinal care for his chronic/complex urologic conditions.

## 2024-05-20 NOTE — HISTORY OF PRESENT ILLNESS
[FreeTextEntry1] : Patient is a 72 yo M who presents for f/u of BPH, elevated PSA.  Prior hx: He has mild-mod LUTS - urgency, frequency q1.5-2 hrs, nocturia x1. He underwent prostate biopsy 6/2019 - path showed HGPIN in 1 core, otherwise negative. TRUS vol 66cc. He was counseled on the implications of HGPIN but refused further evaluation with prostate MRI or repeat prostate biopsy at that time. He was started on flomax for his LUTS. Reports 50% improvement in his LUTS. Better FOS, less urgency/frequency. He remains on flomax and reports stable LUTS. Then he had PSA with PCP - 6.87 in 9/2020. Repeat PSA in 1/2021 6.05. He was now willing to have MRI of prostate. MRI of prostate performed - showed PiRads 4, and PiRads 3 x2 lesions. He underwent MRI fusion prostate biopsy with Dr Barreto in 3/2021 - path was negative.  Interval hx:  PSA trend recently 1/2022 - 7.45 6/2022 - 6.45 12/2022 - 7.0 7/2023 - 5.47  5/20/24 He has been taking tamsulosin consistently, reports stable LUTS FOS good, some hesitancy, nocturia x 2, feels emptying well. No need to strain to void.  PSA 6.94 4/17/2024, Urine test noted 21-50 WBC/hpf. He is asymptomatic for UTI, denied any difficulty voiding, hematuria, burning/dysuria or fever/chills.  No interval event of symptomatic UTI, urinary retention.  Did not void in office today for sample, PVR 14cc.

## 2024-05-20 NOTE — PHYSICAL EXAM
[General Appearance - Well Developed] : well developed [General Appearance - Well Nourished] : well nourished [Normal Appearance] : normal appearance [Well Groomed] : well groomed [General Appearance - In No Acute Distress] : no acute distress [] : no respiratory distress [Exaggerated Use Of Accessory Muscles For Inspiration] : no accessory muscle use [Abdomen Soft] : soft [Urinary Bladder Findings] : the bladder was normal on palpation [Normal Station and Gait] : the gait and station were normal for the patient's age [No Focal Deficits] : no focal deficits [Oriented To Time, Place, And Person] : oriented to person, place, and time

## 2024-11-25 ENCOUNTER — APPOINTMENT (OUTPATIENT)
Dept: UROLOGY | Facility: CLINIC | Age: 74
End: 2024-11-25
Payer: MEDICAID

## 2024-11-25 VITALS
HEART RATE: 89 BPM | TEMPERATURE: 97 F | RESPIRATION RATE: 17 BRPM | BODY MASS INDEX: 27.6 KG/M2 | WEIGHT: 171 LBS | DIASTOLIC BLOOD PRESSURE: 72 MMHG | OXYGEN SATURATION: 97 % | SYSTOLIC BLOOD PRESSURE: 117 MMHG

## 2024-11-25 DIAGNOSIS — R97.20 ELEVATED PROSTATE, SPECIFIC ANTIGEN [PSA]: ICD-10-CM

## 2024-11-25 DIAGNOSIS — E11.9 TYPE 2 DIABETES MELLITUS W/OUT COMPLICATIONS: ICD-10-CM

## 2024-11-25 DIAGNOSIS — N13.8 BENIGN PROSTATIC HYPERPLASIA WITH LOWER URINARY TRACT SYMPMS: ICD-10-CM

## 2024-11-25 DIAGNOSIS — N42.31 PROSTATIC INTRAEPITHELIAL NEOPLASIA: ICD-10-CM

## 2024-11-25 DIAGNOSIS — N40.1 BENIGN PROSTATIC HYPERPLASIA WITH LOWER URINARY TRACT SYMPMS: ICD-10-CM

## 2024-11-25 PROCEDURE — G2211 COMPLEX E/M VISIT ADD ON: CPT | Mod: NC

## 2024-11-25 PROCEDURE — 99214 OFFICE O/P EST MOD 30 MIN: CPT

## 2024-12-25 PROBLEM — F10.90 ALCOHOL USE: Status: INACTIVE | Noted: 2019-06-03

## 2025-05-19 ENCOUNTER — APPOINTMENT (OUTPATIENT)
Dept: UROLOGY | Facility: CLINIC | Age: 75
End: 2025-05-19
Payer: MEDICAID

## 2025-05-19 VITALS
BODY MASS INDEX: 28.08 KG/M2 | TEMPERATURE: 98.2 F | SYSTOLIC BLOOD PRESSURE: 117 MMHG | WEIGHT: 174 LBS | RESPIRATION RATE: 17 BRPM | OXYGEN SATURATION: 99 % | HEART RATE: 87 BPM | DIASTOLIC BLOOD PRESSURE: 72 MMHG

## 2025-05-19 DIAGNOSIS — R97.20 ELEVATED PROSTATE, SPECIFIC ANTIGEN [PSA]: ICD-10-CM

## 2025-05-19 DIAGNOSIS — N13.8 BENIGN PROSTATIC HYPERPLASIA WITH LOWER URINARY TRACT SYMPMS: ICD-10-CM

## 2025-05-19 DIAGNOSIS — N42.31 PROSTATIC INTRAEPITHELIAL NEOPLASIA: ICD-10-CM

## 2025-05-19 DIAGNOSIS — N40.1 BENIGN PROSTATIC HYPERPLASIA WITH LOWER URINARY TRACT SYMPMS: ICD-10-CM

## 2025-05-19 PROCEDURE — 99214 OFFICE O/P EST MOD 30 MIN: CPT

## 2025-05-19 PROCEDURE — G2211 COMPLEX E/M VISIT ADD ON: CPT | Mod: NC

## 2025-05-22 ENCOUNTER — APPOINTMENT (OUTPATIENT)
Age: 75
End: 2025-05-22

## 2025-05-22 LAB
4K SCORE: 3.4
FREE PSA (BIOREFERENCE): 2.19 NG/ML
PERCENT FREE PSA: 34 %
TOTAL PSA (BIOREFERENCE): 6.44 NG/ML